# Patient Record
Sex: MALE | Race: WHITE
[De-identification: names, ages, dates, MRNs, and addresses within clinical notes are randomized per-mention and may not be internally consistent; named-entity substitution may affect disease eponyms.]

---

## 2021-05-17 ENCOUNTER — HOSPITAL ENCOUNTER (OUTPATIENT)
Dept: HOSPITAL 47 - RADCTMAIN | Age: 56
Discharge: HOME | End: 2021-05-17
Attending: FAMILY MEDICINE
Payer: COMMERCIAL

## 2021-05-17 DIAGNOSIS — Z87.891: ICD-10-CM

## 2021-05-17 DIAGNOSIS — Z12.2: Primary | ICD-10-CM

## 2021-05-17 DIAGNOSIS — R91.8: ICD-10-CM

## 2021-05-17 PROCEDURE — 71271 CT THORAX LUNG CANCER SCR C-: CPT

## 2021-05-17 NOTE — CTL
EXAMINATION TYPE:  CT Low Dose Lung

 

DATE OF EXAM ORDERED: 5/17/2021

 

HISTORY: Long-term tobacco use. Lung cancer screening

 

CT DLP: 94 mGycm

CT CTDI: 2.41 mGy

Automated exposure control for dose reduction was used.

 

SCREENING VISIT: Initial study

 

COMPARISON: None.

 

TECHNIQUE: Low dose computed tomography scan was performed through the chest at 1 mm thick sections a
nd reconstructed images in the coronal plane at 1 mm thick sections.

 

CT DIAGNOSTIC QUALITY: Limited, but interpretable

Due to large body habitus.

 

FINDINGS:

LUNG NODULES: Present, detailed below: Occasional scattered micronodules. 

 

For reference 2 mm bilateral subpleural nodule sagittal image 42.

 

LUNGS:

COPD: Severity: Minimal

Fibrosis: Severity: Mild to moderate biapical

Lymph nodes: No greater than 1 cm

Other findings: None

 

RIGHT PLEURAL SPACE:

Effusion: None

Calcification: None

Thickening: None

Pneumothorax: None

 

LEFT PLEURAL SPACE:

Effusion: None

Calcification: None

Thickening: None

Pneumothorax: None

 

HEART:  

Heart Size: Normal

Coronary calcification: None

Pericardial effusion: None

 

OTHER FINDINGS:

Upper abdomen: Moderate to severe fat replaced atrophy of the pancreas.

Bony thorax: None.

Supraclavicular region: No greater than 1 cm

Other: None.

 

IMPRESSION: Suboptimal study. No concerning greater than 6 mm pulmonary nodules.

 

CT LUNG RAD AND CT CHEST RECOMMENDATION: Lung-Rad 2 Benign Appearance or Behavior: Continue annual sc
reening with LDCT in 12 months.

 

S Modifier (other clinically significant findings): None